# Patient Record
(demographics unavailable — no encounter records)

---

## 2025-06-25 NOTE — PHYSICAL EXAM
[Normal Appearance] : normal appearance [General Appearance - In No Acute Distress] : no acute distress [Edema] : no peripheral edema [] : no respiratory distress [Abdomen Soft] : soft [Abdomen Tenderness] : non-tender [Urethral Meatus] : meatus normal [Penis Abnormality] : normal circumcised penis [Urinary Bladder Findings] : the bladder was normal on palpation [Scrotum] : the scrotum was normal [Testes Tenderness] : no tenderness of the testes [Testes Mass (___cm)] : there were no testicular masses [Normal Station and Gait] : the gait and station were normal for the patient's age [Oriented To Time, Place, And Person] : oriented to person, place, and time [Affect] : the affect was normal [Chaperone Declined] : Patient declined chaperone [de-identified] : Likely L inguinal hernia  [de-identified] : L varicoceles ipsilateral with hernia, no R varicocele difficult to palpate L vas, R vas palpable testes 18 cc b/l nontendner  [FreeTextEntry3] : A chaperone was offered to accompany the patient during the physical examination, patient was informed chaperone would be readily available to oversee the exam. Patient refused a chaperone at this time and proceeded to exam.

## 2025-06-25 NOTE — END OF VISIT
[FreeTextEntry3] : I, Dr. Laureano, personally performed the evaluation and management (E/M) services for this new patient.  That E/M includes conducting the clinically appropriate initial history &/or exam, assessing all conditions, and establishing the plan of care.  Today, my TRISTAN, Prasanna Lora, was here to observe my evaluation and management service for this patient & follow plan of care established by me going forward.

## 2025-06-25 NOTE — ASSESSMENT
[FreeTextEntry1] : 28 year M with no significant PMHx presenting for varicocele and hypogonadism  hypogonadism  - SA, TT, LH, FSH, E2  - L varicocele with L inguinal hernia on exam  if abnormal sa will repeat scrotal ultarsoudn in office consider varicocelectomy and herniorrhaphy same setting

## 2025-06-25 NOTE — HISTORY OF PRESENT ILLNESS
[FreeTextEntry1] : SHAHNAZ REBOLLAR is a 28 year M with no significant PMHx presenting for varicocele and hypogonadism on 06/25/2025   He reports - wife child from prev marriage 4 years ago, natural conception - patient has never conceived  - No SA at this time  - hormonal testing shows 921 TT with no TRT hx  - Sonogram with demonstrated varicocele   He denies hematuria dysuria n/v/d flank pain fever chills urinary complaints ED    Social history; tobacco smoker previously was 1/2 PPD, working on reduction    Family history: Denies   Allergies: NKDA